# Patient Record
Sex: MALE | Race: BLACK OR AFRICAN AMERICAN
[De-identification: names, ages, dates, MRNs, and addresses within clinical notes are randomized per-mention and may not be internally consistent; named-entity substitution may affect disease eponyms.]

---

## 2020-04-25 ENCOUNTER — HOSPITAL ENCOUNTER (EMERGENCY)
Dept: HOSPITAL 87 - ER | Age: 55
LOS: 1 days | Discharge: HOME | End: 2020-04-26
Payer: MEDICAID

## 2020-04-25 VITALS — HEIGHT: 74 IN | BODY MASS INDEX: 17.54 KG/M2 | WEIGHT: 136.69 LBS

## 2020-04-25 DIAGNOSIS — R53.1: ICD-10-CM

## 2020-04-25 DIAGNOSIS — B34.9: Primary | ICD-10-CM

## 2020-04-25 DIAGNOSIS — E11.9: ICD-10-CM

## 2020-04-25 DIAGNOSIS — J45.909: ICD-10-CM

## 2020-04-25 PROCEDURE — 99285 EMERGENCY DEPT VISIT HI MDM: CPT

## 2020-04-25 PROCEDURE — 71045 X-RAY EXAM CHEST 1 VIEW: CPT

## 2020-04-26 VITALS — DIASTOLIC BLOOD PRESSURE: 62 MMHG | SYSTOLIC BLOOD PRESSURE: 129 MMHG

## 2022-02-19 ENCOUNTER — HOSPITAL ENCOUNTER (INPATIENT)
Dept: HOSPITAL 87 - ER | Age: 57
LOS: 6 days | Discharge: HOME | DRG: 321 | End: 2022-02-25
Attending: INTERNAL MEDICINE | Admitting: INTERNAL MEDICINE
Payer: MEDICAID

## 2022-02-19 VITALS — BODY MASS INDEX: 21.55 KG/M2 | WEIGHT: 177 LBS | HEIGHT: 76 IN

## 2022-02-19 DIAGNOSIS — Z20.822: ICD-10-CM

## 2022-02-19 DIAGNOSIS — E11.9: ICD-10-CM

## 2022-02-19 DIAGNOSIS — M47.12: ICD-10-CM

## 2022-02-19 DIAGNOSIS — D64.9: ICD-10-CM

## 2022-02-19 DIAGNOSIS — F17.210: ICD-10-CM

## 2022-02-19 DIAGNOSIS — Z71.51: ICD-10-CM

## 2022-02-19 DIAGNOSIS — M48.02: Primary | ICD-10-CM

## 2022-02-19 DIAGNOSIS — D72.829: ICD-10-CM
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
## 2022-02-19 DIAGNOSIS — F14.90: ICD-10-CM

## 2022-02-19 DIAGNOSIS — J45.909: ICD-10-CM

## 2022-02-19 DIAGNOSIS — I10: ICD-10-CM

## 2022-02-19 DIAGNOSIS — G82.50: ICD-10-CM

## 2022-02-19 DIAGNOSIS — T38.0X5A: ICD-10-CM

## 2022-02-19 DIAGNOSIS — M47.22: ICD-10-CM

## 2022-02-19 LAB
BASOPHILS NFR BLD AUTO: 0.5 % (ref 0–2)
CHLORIDE SERPL-SCNC: 105 MEQ/L (ref 98–107)
EOSINOPHIL NFR BLD AUTO: 0.3 % (ref 0–5)
ERYTHROCYTE [DISTWIDTH] IN BLOOD BY AUTOMATED COUNT: 13.8 % (ref 11.6–14.6)
HCT VFR BLD AUTO: 37.1 % (ref 42–52)
HGB BLD-MCNC: 12.3 G/DL (ref 14–18)
LYMPHOCYTES NFR BLD AUTO: 13.5 % (ref 20–50)
MCH RBC QN AUTO: 29.9 PG (ref 28–32)
MCV RBC AUTO: 90.1 FL (ref 80–94)
MONOCYTES NFR BLD AUTO: 8 % (ref 2–8)
NEUTROPHILS NFR BLD AUTO: 77.7 % (ref 40–76)
PLATELET # BLD AUTO: 202 X1000/UL (ref 130–400)
PMV BLD AUTO: 7.8 FL (ref 7.4–10.4)
RBC # BLD AUTO: 4.11 MILL/UL (ref 4.7–6.1)

## 2022-02-19 PROCEDURE — 76000 FLUOROSCOPY <1 HR PHYS/QHP: CPT

## 2022-02-19 PROCEDURE — 85025 COMPLETE CBC W/AUTO DIFF WBC: CPT

## 2022-02-19 PROCEDURE — 88304 TISSUE EXAM BY PATHOLOGIST: CPT

## 2022-02-19 PROCEDURE — 36415 COLL VENOUS BLD VENIPUNCTURE: CPT

## 2022-02-19 PROCEDURE — 86850 RBC ANTIBODY SCREEN: CPT

## 2022-02-19 PROCEDURE — 80305 DRUG TEST PRSMV DIR OPT OBS: CPT

## 2022-02-19 PROCEDURE — 97116 GAIT TRAINING THERAPY: CPT

## 2022-02-19 PROCEDURE — 97161 PT EVAL LOW COMPLEX 20 MIN: CPT

## 2022-02-19 PROCEDURE — 71045 X-RAY EXAM CHEST 1 VIEW: CPT

## 2022-02-19 PROCEDURE — C1762 CONN TISS, HUMAN(INC FASCIA): HCPCS

## 2022-02-19 PROCEDURE — 87426 SARSCOV CORONAVIRUS AG IA: CPT

## 2022-02-19 PROCEDURE — 95926 SOMATOSENSORY TESTING: CPT

## 2022-02-19 PROCEDURE — 95925 SOMATOSENSORY TESTING: CPT

## 2022-02-19 PROCEDURE — 95928 C MOTOR EVOKED UPPR LIMBS: CPT

## 2022-02-19 PROCEDURE — 99285 EMERGENCY DEPT VISIT HI MDM: CPT

## 2022-02-19 PROCEDURE — 93005 ELECTROCARDIOGRAM TRACING: CPT

## 2022-02-19 PROCEDURE — 86900 BLOOD TYPING SEROLOGIC ABO: CPT

## 2022-02-19 PROCEDURE — 72040 X-RAY EXAM NECK SPINE 2-3 VW: CPT

## 2022-02-19 PROCEDURE — 82962 GLUCOSE BLOOD TEST: CPT

## 2022-02-19 PROCEDURE — 80048 BASIC METABOLIC PNL TOTAL CA: CPT

## 2022-02-19 PROCEDURE — 72141 MRI NECK SPINE W/O DYE: CPT

## 2022-02-19 PROCEDURE — 80053 COMPREHEN METABOLIC PANEL: CPT

## 2022-02-19 PROCEDURE — 95929 C MOTOR EVOKED LWR LIMBS: CPT

## 2022-02-19 PROCEDURE — 95863 NEEDLE EMG 3 EXTREMITIES: CPT

## 2022-02-19 PROCEDURE — 88311 DECALCIFY TISSUE: CPT

## 2022-02-19 PROCEDURE — 97164 PT RE-EVAL EST PLAN CARE: CPT

## 2022-02-19 PROCEDURE — 97166 OT EVAL MOD COMPLEX 45 MIN: CPT

## 2022-02-20 VITALS — SYSTOLIC BLOOD PRESSURE: 130 MMHG | DIASTOLIC BLOOD PRESSURE: 72 MMHG

## 2022-02-20 VITALS — DIASTOLIC BLOOD PRESSURE: 76 MMHG | SYSTOLIC BLOOD PRESSURE: 142 MMHG

## 2022-02-20 VITALS — DIASTOLIC BLOOD PRESSURE: 82 MMHG | SYSTOLIC BLOOD PRESSURE: 140 MMHG

## 2022-02-20 LAB
AMPHETAMINES UR QL SCN: NEGATIVE
BARBITURATES UR QL SCN: NEGATIVE
BENZODIAZ UR QL SCN: NEGATIVE
BZE UR QL SCN: (no result)
CANNABINOIDS UR QL SCN: NEGATIVE
METHADONE UR QL SCN: NEGATIVE
OPIATES UR QL SCN: (no result)
PCP UR QL SCN: NEGATIVE

## 2022-02-20 RX ADMIN — INSULIN LISPRO SCH UNIT: 100 INJECTION, SOLUTION INTRAVENOUS; SUBCUTANEOUS at 17:50

## 2022-02-20 RX ADMIN — Medication SCH STRIP: at 16:00

## 2022-02-20 RX ADMIN — INSULIN LISPRO SCH UNIT: 100 INJECTION, SOLUTION INTRAVENOUS; SUBCUTANEOUS at 11:28

## 2022-02-20 RX ADMIN — Medication SCH STRIP: at 11:44

## 2022-02-20 RX ADMIN — Medication SCH STRIP: at 21:28

## 2022-02-20 RX ADMIN — INSULIN LISPRO SCH UNIT: 100 INJECTION, SOLUTION INTRAVENOUS; SUBCUTANEOUS at 21:45

## 2022-02-21 VITALS — DIASTOLIC BLOOD PRESSURE: 77 MMHG | SYSTOLIC BLOOD PRESSURE: 142 MMHG

## 2022-02-21 VITALS — DIASTOLIC BLOOD PRESSURE: 76 MMHG | SYSTOLIC BLOOD PRESSURE: 116 MMHG

## 2022-02-21 VITALS — DIASTOLIC BLOOD PRESSURE: 78 MMHG | SYSTOLIC BLOOD PRESSURE: 139 MMHG

## 2022-02-21 VITALS — SYSTOLIC BLOOD PRESSURE: 136 MMHG | DIASTOLIC BLOOD PRESSURE: 85 MMHG

## 2022-02-21 VITALS — SYSTOLIC BLOOD PRESSURE: 142 MMHG | DIASTOLIC BLOOD PRESSURE: 86 MMHG

## 2022-02-21 VITALS — DIASTOLIC BLOOD PRESSURE: 82 MMHG | SYSTOLIC BLOOD PRESSURE: 142 MMHG

## 2022-02-21 LAB
INR PPP: 1
PROTHROMBIN TIME: 10.9 SEC (ref 9.6–11)

## 2022-02-21 RX ADMIN — DEXAMETHASONE SODIUM PHOSPHATE SCH MG: 4 INJECTION, SOLUTION INTRAMUSCULAR; INTRAVENOUS at 23:44

## 2022-02-21 RX ADMIN — Medication SCH STRIP: at 13:03

## 2022-02-21 RX ADMIN — INSULIN LISPRO SCH UNIT: 100 INJECTION, SOLUTION INTRAVENOUS; SUBCUTANEOUS at 21:00

## 2022-02-21 RX ADMIN — INSULIN LISPRO SCH UNIT: 100 INJECTION, SOLUTION INTRAVENOUS; SUBCUTANEOUS at 12:50

## 2022-02-21 RX ADMIN — DEXAMETHASONE SODIUM PHOSPHATE SCH MG: 4 INJECTION, SOLUTION INTRAMUSCULAR; INTRAVENOUS at 19:15

## 2022-02-21 RX ADMIN — Medication SCH STRIP: at 07:56

## 2022-02-21 RX ADMIN — Medication SCH STRIP: at 17:20

## 2022-02-21 RX ADMIN — INSULIN LISPRO SCH UNIT: 100 INJECTION, SOLUTION INTRAVENOUS; SUBCUTANEOUS at 17:50

## 2022-02-21 RX ADMIN — Medication SCH STRIP: at 21:00

## 2022-02-21 RX ADMIN — INSULIN LISPRO SCH UNIT: 100 INJECTION, SOLUTION INTRAVENOUS; SUBCUTANEOUS at 07:50

## 2022-02-22 VITALS — SYSTOLIC BLOOD PRESSURE: 124 MMHG | DIASTOLIC BLOOD PRESSURE: 66 MMHG

## 2022-02-22 VITALS — DIASTOLIC BLOOD PRESSURE: 65 MMHG | SYSTOLIC BLOOD PRESSURE: 110 MMHG

## 2022-02-22 VITALS — DIASTOLIC BLOOD PRESSURE: 61 MMHG | SYSTOLIC BLOOD PRESSURE: 114 MMHG

## 2022-02-22 VITALS — SYSTOLIC BLOOD PRESSURE: 109 MMHG | DIASTOLIC BLOOD PRESSURE: 64 MMHG

## 2022-02-22 VITALS — DIASTOLIC BLOOD PRESSURE: 59 MMHG | SYSTOLIC BLOOD PRESSURE: 111 MMHG

## 2022-02-22 VITALS — DIASTOLIC BLOOD PRESSURE: 67 MMHG | SYSTOLIC BLOOD PRESSURE: 111 MMHG

## 2022-02-22 LAB
APTT PPP: 28.9 SEC (ref 23.4–31)
BASOPHILS NFR BLD AUTO: 0.3 % (ref 0–2)
CHLORIDE SERPL-SCNC: 102 MEQ/L (ref 98–107)
EOSINOPHIL NFR BLD AUTO: 0 % (ref 0–5)
ERYTHROCYTE [DISTWIDTH] IN BLOOD BY AUTOMATED COUNT: 13.8 % (ref 11.6–14.6)
HCT VFR BLD AUTO: 41.4 % (ref 42–52)
HGB BLD-MCNC: 13.4 G/DL (ref 14–18)
INR PPP: 1
LYMPHOCYTES NFR BLD AUTO: 7.3 % (ref 20–50)
MCH RBC QN AUTO: 29.4 PG (ref 28–32)
MCV RBC AUTO: 90.9 FL (ref 80–94)
MONOCYTES NFR BLD AUTO: 3.9 % (ref 2–8)
NEUTROPHILS NFR BLD AUTO: 88.5 % (ref 40–76)
PLATELET # BLD AUTO: 246 X1000/UL (ref 130–400)
PMV BLD AUTO: 8.2 FL (ref 7.4–10.4)
PROTHROMBIN TIME: 10.7 SEC (ref 9.6–11)
RBC # BLD AUTO: 4.55 MILL/UL (ref 4.7–6.1)

## 2022-02-22 RX ADMIN — INSULIN LISPRO SCH UNIT: 100 INJECTION, SOLUTION INTRAVENOUS; SUBCUTANEOUS at 17:30

## 2022-02-22 RX ADMIN — DEXAMETHASONE SODIUM PHOSPHATE SCH MG: 4 INJECTION, SOLUTION INTRAMUSCULAR; INTRAVENOUS at 06:02

## 2022-02-22 RX ADMIN — Medication SCH STRIP: at 21:00

## 2022-02-22 RX ADMIN — DEXAMETHASONE SODIUM PHOSPHATE SCH MG: 4 INJECTION, SOLUTION INTRAMUSCULAR; INTRAVENOUS at 23:41

## 2022-02-22 RX ADMIN — INSULIN LISPRO SCH UNIT: 100 INJECTION, SOLUTION INTRAVENOUS; SUBCUTANEOUS at 21:29

## 2022-02-22 RX ADMIN — INSULIN LISPRO SCH UNIT: 100 INJECTION, SOLUTION INTRAVENOUS; SUBCUTANEOUS at 08:23

## 2022-02-22 RX ADMIN — Medication SCH STRIP: at 17:05

## 2022-02-22 RX ADMIN — INSULIN LISPRO SCH UNIT: 100 INJECTION, SOLUTION INTRAVENOUS; SUBCUTANEOUS at 12:32

## 2022-02-22 RX ADMIN — DEXAMETHASONE SODIUM PHOSPHATE SCH MG: 4 INJECTION, SOLUTION INTRAMUSCULAR; INTRAVENOUS at 12:16

## 2022-02-22 RX ADMIN — Medication SCH STRIP: at 12:18

## 2022-02-22 RX ADMIN — Medication SCH STRIP: at 06:36

## 2022-02-22 RX ADMIN — DEXAMETHASONE SODIUM PHOSPHATE SCH MG: 4 INJECTION, SOLUTION INTRAMUSCULAR; INTRAVENOUS at 17:25

## 2022-02-23 VITALS — DIASTOLIC BLOOD PRESSURE: 78 MMHG | SYSTOLIC BLOOD PRESSURE: 140 MMHG

## 2022-02-23 VITALS — SYSTOLIC BLOOD PRESSURE: 137 MMHG | DIASTOLIC BLOOD PRESSURE: 75 MMHG

## 2022-02-23 VITALS — DIASTOLIC BLOOD PRESSURE: 71 MMHG | SYSTOLIC BLOOD PRESSURE: 115 MMHG

## 2022-02-23 VITALS — SYSTOLIC BLOOD PRESSURE: 106 MMHG | DIASTOLIC BLOOD PRESSURE: 65 MMHG

## 2022-02-23 VITALS — SYSTOLIC BLOOD PRESSURE: 152 MMHG | DIASTOLIC BLOOD PRESSURE: 80 MMHG

## 2022-02-23 VITALS — DIASTOLIC BLOOD PRESSURE: 74 MMHG | SYSTOLIC BLOOD PRESSURE: 124 MMHG

## 2022-02-23 VITALS — SYSTOLIC BLOOD PRESSURE: 155 MMHG | DIASTOLIC BLOOD PRESSURE: 84 MMHG

## 2022-02-23 VITALS — DIASTOLIC BLOOD PRESSURE: 77 MMHG | SYSTOLIC BLOOD PRESSURE: 150 MMHG

## 2022-02-23 VITALS — DIASTOLIC BLOOD PRESSURE: 46 MMHG | SYSTOLIC BLOOD PRESSURE: 212 MMHG

## 2022-02-23 VITALS — SYSTOLIC BLOOD PRESSURE: 138 MMHG | DIASTOLIC BLOOD PRESSURE: 71 MMHG

## 2022-02-23 VITALS — SYSTOLIC BLOOD PRESSURE: 146 MMHG | DIASTOLIC BLOOD PRESSURE: 71 MMHG

## 2022-02-23 VITALS — SYSTOLIC BLOOD PRESSURE: 136 MMHG | DIASTOLIC BLOOD PRESSURE: 78 MMHG

## 2022-02-23 VITALS — DIASTOLIC BLOOD PRESSURE: 72 MMHG | SYSTOLIC BLOOD PRESSURE: 130 MMHG

## 2022-02-23 VITALS — SYSTOLIC BLOOD PRESSURE: 150 MMHG | DIASTOLIC BLOOD PRESSURE: 79 MMHG

## 2022-02-23 VITALS — SYSTOLIC BLOOD PRESSURE: 127 MMHG | DIASTOLIC BLOOD PRESSURE: 73 MMHG

## 2022-02-23 VITALS — SYSTOLIC BLOOD PRESSURE: 141 MMHG | DIASTOLIC BLOOD PRESSURE: 75 MMHG

## 2022-02-23 VITALS — SYSTOLIC BLOOD PRESSURE: 141 MMHG | DIASTOLIC BLOOD PRESSURE: 71 MMHG

## 2022-02-23 VITALS — DIASTOLIC BLOOD PRESSURE: 92 MMHG | SYSTOLIC BLOOD PRESSURE: 154 MMHG

## 2022-02-23 VITALS — DIASTOLIC BLOOD PRESSURE: 70 MMHG | SYSTOLIC BLOOD PRESSURE: 125 MMHG

## 2022-02-23 VITALS — DIASTOLIC BLOOD PRESSURE: 72 MMHG | SYSTOLIC BLOOD PRESSURE: 126 MMHG

## 2022-02-23 VITALS — SYSTOLIC BLOOD PRESSURE: 125 MMHG | DIASTOLIC BLOOD PRESSURE: 68 MMHG

## 2022-02-23 VITALS — DIASTOLIC BLOOD PRESSURE: 58 MMHG | SYSTOLIC BLOOD PRESSURE: 153 MMHG

## 2022-02-23 VITALS — DIASTOLIC BLOOD PRESSURE: 102 MMHG | SYSTOLIC BLOOD PRESSURE: 128 MMHG

## 2022-02-23 VITALS — SYSTOLIC BLOOD PRESSURE: 148 MMHG | DIASTOLIC BLOOD PRESSURE: 72 MMHG

## 2022-02-23 VITALS — SYSTOLIC BLOOD PRESSURE: 147 MMHG | DIASTOLIC BLOOD PRESSURE: 77 MMHG

## 2022-02-23 VITALS — DIASTOLIC BLOOD PRESSURE: 73 MMHG | SYSTOLIC BLOOD PRESSURE: 142 MMHG

## 2022-02-23 VITALS — SYSTOLIC BLOOD PRESSURE: 113 MMHG | DIASTOLIC BLOOD PRESSURE: 74 MMHG

## 2022-02-23 VITALS — DIASTOLIC BLOOD PRESSURE: 66 MMHG | SYSTOLIC BLOOD PRESSURE: 137 MMHG

## 2022-02-23 VITALS — SYSTOLIC BLOOD PRESSURE: 154 MMHG | DIASTOLIC BLOOD PRESSURE: 81 MMHG

## 2022-02-23 VITALS — DIASTOLIC BLOOD PRESSURE: 61 MMHG | SYSTOLIC BLOOD PRESSURE: 129 MMHG

## 2022-02-23 VITALS — SYSTOLIC BLOOD PRESSURE: 116 MMHG | DIASTOLIC BLOOD PRESSURE: 69 MMHG

## 2022-02-23 VITALS — SYSTOLIC BLOOD PRESSURE: 149 MMHG | DIASTOLIC BLOOD PRESSURE: 67 MMHG

## 2022-02-23 VITALS — DIASTOLIC BLOOD PRESSURE: 75 MMHG | SYSTOLIC BLOOD PRESSURE: 135 MMHG

## 2022-02-23 VITALS — SYSTOLIC BLOOD PRESSURE: 110 MMHG | DIASTOLIC BLOOD PRESSURE: 64 MMHG

## 2022-02-23 VITALS — DIASTOLIC BLOOD PRESSURE: 77 MMHG | SYSTOLIC BLOOD PRESSURE: 139 MMHG

## 2022-02-23 VITALS — DIASTOLIC BLOOD PRESSURE: 80 MMHG | SYSTOLIC BLOOD PRESSURE: 144 MMHG

## 2022-02-23 VITALS — DIASTOLIC BLOOD PRESSURE: 70 MMHG | SYSTOLIC BLOOD PRESSURE: 142 MMHG

## 2022-02-23 VITALS — DIASTOLIC BLOOD PRESSURE: 74 MMHG | SYSTOLIC BLOOD PRESSURE: 144 MMHG

## 2022-02-23 VITALS — DIASTOLIC BLOOD PRESSURE: 71 MMHG | SYSTOLIC BLOOD PRESSURE: 142 MMHG

## 2022-02-23 VITALS — SYSTOLIC BLOOD PRESSURE: 141 MMHG | DIASTOLIC BLOOD PRESSURE: 92 MMHG

## 2022-02-23 VITALS — SYSTOLIC BLOOD PRESSURE: 124 MMHG | DIASTOLIC BLOOD PRESSURE: 76 MMHG

## 2022-02-23 VITALS — DIASTOLIC BLOOD PRESSURE: 79 MMHG | SYSTOLIC BLOOD PRESSURE: 158 MMHG

## 2022-02-23 VITALS — SYSTOLIC BLOOD PRESSURE: 145 MMHG | DIASTOLIC BLOOD PRESSURE: 76 MMHG

## 2022-02-23 VITALS — SYSTOLIC BLOOD PRESSURE: 140 MMHG | DIASTOLIC BLOOD PRESSURE: 69 MMHG

## 2022-02-23 VITALS — SYSTOLIC BLOOD PRESSURE: 148 MMHG | DIASTOLIC BLOOD PRESSURE: 79 MMHG

## 2022-02-23 VITALS — SYSTOLIC BLOOD PRESSURE: 143 MMHG | DIASTOLIC BLOOD PRESSURE: 76 MMHG

## 2022-02-23 VITALS — DIASTOLIC BLOOD PRESSURE: 78 MMHG | SYSTOLIC BLOOD PRESSURE: 145 MMHG

## 2022-02-23 VITALS — DIASTOLIC BLOOD PRESSURE: 65 MMHG | SYSTOLIC BLOOD PRESSURE: 121 MMHG

## 2022-02-23 VITALS — SYSTOLIC BLOOD PRESSURE: 140 MMHG | DIASTOLIC BLOOD PRESSURE: 78 MMHG

## 2022-02-23 VITALS — SYSTOLIC BLOOD PRESSURE: 144 MMHG | DIASTOLIC BLOOD PRESSURE: 76 MMHG

## 2022-02-23 VITALS — DIASTOLIC BLOOD PRESSURE: 72 MMHG | SYSTOLIC BLOOD PRESSURE: 123 MMHG

## 2022-02-23 VITALS — DIASTOLIC BLOOD PRESSURE: 88 MMHG | SYSTOLIC BLOOD PRESSURE: 140 MMHG

## 2022-02-23 VITALS — SYSTOLIC BLOOD PRESSURE: 138 MMHG | DIASTOLIC BLOOD PRESSURE: 70 MMHG

## 2022-02-23 VITALS — SYSTOLIC BLOOD PRESSURE: 140 MMHG | DIASTOLIC BLOOD PRESSURE: 76 MMHG

## 2022-02-23 VITALS — SYSTOLIC BLOOD PRESSURE: 128 MMHG | DIASTOLIC BLOOD PRESSURE: 67 MMHG

## 2022-02-23 VITALS — DIASTOLIC BLOOD PRESSURE: 65 MMHG | SYSTOLIC BLOOD PRESSURE: 153 MMHG

## 2022-02-23 VITALS — DIASTOLIC BLOOD PRESSURE: 82 MMHG | SYSTOLIC BLOOD PRESSURE: 145 MMHG

## 2022-02-23 VITALS — SYSTOLIC BLOOD PRESSURE: 145 MMHG | DIASTOLIC BLOOD PRESSURE: 78 MMHG

## 2022-02-23 PROCEDURE — 01N10ZZ RELEASE CERVICAL NERVE, OPEN APPROACH: ICD-10-PCS | Performed by: NEUROLOGICAL SURGERY

## 2022-02-23 PROCEDURE — 0RG2070 FUSION OF 2 OR MORE CERVICAL VERTEBRAL JOINTS WITH AUTOLOGOUS TISSUE SUBSTITUTE, ANTERIOR APPROACH, ANTERIOR COLUMN, OPEN APPROACH: ICD-10-PCS | Performed by: NEUROLOGICAL SURGERY

## 2022-02-23 PROCEDURE — 4A11X4G MONITORING OF PERIPHERAL NERVOUS ELECTRICAL ACTIVITY, INTRAOPERATIVE, EXTERNAL APPROACH: ICD-10-PCS | Performed by: NEUROLOGICAL SURGERY

## 2022-02-23 PROCEDURE — 0RB30ZZ EXCISION OF CERVICAL VERTEBRAL DISC, OPEN APPROACH: ICD-10-PCS | Performed by: NEUROLOGICAL SURGERY

## 2022-02-23 RX ADMIN — INSULIN LISPRO SCH UNIT: 100 INJECTION, SOLUTION INTRAVENOUS; SUBCUTANEOUS at 21:36

## 2022-02-23 RX ADMIN — INSULIN GLARGINE SCH UNIT: 100 INJECTION, SOLUTION SUBCUTANEOUS at 22:24

## 2022-02-23 RX ADMIN — AMLODIPINE BESYLATE SCH MG: 5 TABLET ORAL at 17:53

## 2022-02-23 RX ADMIN — DEXAMETHASONE SODIUM PHOSPHATE SCH MG: 4 INJECTION, SOLUTION INTRAMUSCULAR; INTRAVENOUS at 06:19

## 2022-02-23 RX ADMIN — CEFAZOLIN SODIUM SCH MLS/HR: 1 INJECTION, SOLUTION INTRAVENOUS at 15:37

## 2022-02-23 RX ADMIN — DEXAMETHASONE SODIUM PHOSPHATE SCH MG: 4 INJECTION, SOLUTION INTRAMUSCULAR; INTRAVENOUS at 17:53

## 2022-02-23 RX ADMIN — INSULIN LISPRO SCH UNIT: 100 INJECTION, SOLUTION INTRAVENOUS; SUBCUTANEOUS at 13:15

## 2022-02-23 RX ADMIN — Medication SCH STRIP: at 11:30

## 2022-02-23 RX ADMIN — INSULIN LISPRO SCH UNIT: 100 INJECTION, SOLUTION INTRAVENOUS; SUBCUTANEOUS at 17:56

## 2022-02-23 RX ADMIN — CEFAZOLIN SODIUM SCH MLS/HR: 1 INJECTION, SOLUTION INTRAVENOUS at 21:37

## 2022-02-23 RX ADMIN — Medication SCH STRIP: at 06:57

## 2022-02-23 RX ADMIN — PANTOPRAZOLE SODIUM PRN MLS/HR: 40 INJECTION, POWDER, FOR SOLUTION INTRAVENOUS at 20:13

## 2022-02-23 RX ADMIN — Medication SCH STRIP: at 21:29

## 2022-02-23 RX ADMIN — DEXAMETHASONE SODIUM PHOSPHATE SCH MG: 4 INJECTION, SOLUTION INTRAMUSCULAR; INTRAVENOUS at 13:06

## 2022-02-23 RX ADMIN — PANTOPRAZOLE SODIUM PRN MLS/HR: 40 INJECTION, POWDER, FOR SOLUTION INTRAVENOUS at 13:17

## 2022-02-23 RX ADMIN — DEXAMETHASONE SODIUM PHOSPHATE SCH MG: 4 INJECTION, SOLUTION INTRAMUSCULAR; INTRAVENOUS at 23:40

## 2022-02-23 RX ADMIN — Medication SCH STRIP: at 16:30

## 2022-02-24 VITALS — SYSTOLIC BLOOD PRESSURE: 133 MMHG | DIASTOLIC BLOOD PRESSURE: 67 MMHG

## 2022-02-24 VITALS — DIASTOLIC BLOOD PRESSURE: 73 MMHG | SYSTOLIC BLOOD PRESSURE: 102 MMHG

## 2022-02-24 VITALS — DIASTOLIC BLOOD PRESSURE: 60 MMHG | SYSTOLIC BLOOD PRESSURE: 131 MMHG

## 2022-02-24 VITALS — DIASTOLIC BLOOD PRESSURE: 63 MMHG | SYSTOLIC BLOOD PRESSURE: 131 MMHG

## 2022-02-24 VITALS — DIASTOLIC BLOOD PRESSURE: 102 MMHG | SYSTOLIC BLOOD PRESSURE: 137 MMHG

## 2022-02-24 VITALS — SYSTOLIC BLOOD PRESSURE: 133 MMHG | DIASTOLIC BLOOD PRESSURE: 60 MMHG

## 2022-02-24 VITALS — DIASTOLIC BLOOD PRESSURE: 65 MMHG | SYSTOLIC BLOOD PRESSURE: 136 MMHG

## 2022-02-24 VITALS — DIASTOLIC BLOOD PRESSURE: 78 MMHG | SYSTOLIC BLOOD PRESSURE: 140 MMHG

## 2022-02-24 VITALS — DIASTOLIC BLOOD PRESSURE: 74 MMHG | SYSTOLIC BLOOD PRESSURE: 134 MMHG

## 2022-02-24 VITALS — DIASTOLIC BLOOD PRESSURE: 76 MMHG | SYSTOLIC BLOOD PRESSURE: 132 MMHG

## 2022-02-24 VITALS — DIASTOLIC BLOOD PRESSURE: 74 MMHG | SYSTOLIC BLOOD PRESSURE: 138 MMHG

## 2022-02-24 VITALS — DIASTOLIC BLOOD PRESSURE: 88 MMHG | SYSTOLIC BLOOD PRESSURE: 134 MMHG

## 2022-02-24 VITALS — DIASTOLIC BLOOD PRESSURE: 92 MMHG | SYSTOLIC BLOOD PRESSURE: 145 MMHG

## 2022-02-24 VITALS — SYSTOLIC BLOOD PRESSURE: 122 MMHG | DIASTOLIC BLOOD PRESSURE: 73 MMHG

## 2022-02-24 VITALS — SYSTOLIC BLOOD PRESSURE: 142 MMHG | DIASTOLIC BLOOD PRESSURE: 63 MMHG

## 2022-02-24 VITALS — DIASTOLIC BLOOD PRESSURE: 93 MMHG | SYSTOLIC BLOOD PRESSURE: 152 MMHG

## 2022-02-24 VITALS — SYSTOLIC BLOOD PRESSURE: 137 MMHG | DIASTOLIC BLOOD PRESSURE: 68 MMHG

## 2022-02-24 VITALS — DIASTOLIC BLOOD PRESSURE: 71 MMHG | SYSTOLIC BLOOD PRESSURE: 129 MMHG

## 2022-02-24 VITALS — DIASTOLIC BLOOD PRESSURE: 56 MMHG | SYSTOLIC BLOOD PRESSURE: 124 MMHG

## 2022-02-24 VITALS — SYSTOLIC BLOOD PRESSURE: 127 MMHG | DIASTOLIC BLOOD PRESSURE: 62 MMHG

## 2022-02-24 VITALS — SYSTOLIC BLOOD PRESSURE: 157 MMHG | DIASTOLIC BLOOD PRESSURE: 94 MMHG

## 2022-02-24 VITALS — SYSTOLIC BLOOD PRESSURE: 83 MMHG | DIASTOLIC BLOOD PRESSURE: 51 MMHG

## 2022-02-24 VITALS — SYSTOLIC BLOOD PRESSURE: 144 MMHG | DIASTOLIC BLOOD PRESSURE: 113 MMHG

## 2022-02-24 VITALS — DIASTOLIC BLOOD PRESSURE: 68 MMHG | SYSTOLIC BLOOD PRESSURE: 128 MMHG

## 2022-02-24 VITALS — SYSTOLIC BLOOD PRESSURE: 144 MMHG | DIASTOLIC BLOOD PRESSURE: 73 MMHG

## 2022-02-24 VITALS — SYSTOLIC BLOOD PRESSURE: 149 MMHG | DIASTOLIC BLOOD PRESSURE: 91 MMHG

## 2022-02-24 VITALS — SYSTOLIC BLOOD PRESSURE: 147 MMHG | DIASTOLIC BLOOD PRESSURE: 68 MMHG

## 2022-02-24 VITALS — DIASTOLIC BLOOD PRESSURE: 68 MMHG | SYSTOLIC BLOOD PRESSURE: 107 MMHG

## 2022-02-24 VITALS — SYSTOLIC BLOOD PRESSURE: 157 MMHG | DIASTOLIC BLOOD PRESSURE: 90 MMHG

## 2022-02-24 VITALS — SYSTOLIC BLOOD PRESSURE: 126 MMHG | DIASTOLIC BLOOD PRESSURE: 64 MMHG

## 2022-02-24 VITALS — SYSTOLIC BLOOD PRESSURE: 132 MMHG | DIASTOLIC BLOOD PRESSURE: 77 MMHG

## 2022-02-24 VITALS — DIASTOLIC BLOOD PRESSURE: 50 MMHG | SYSTOLIC BLOOD PRESSURE: 131 MMHG

## 2022-02-24 VITALS — DIASTOLIC BLOOD PRESSURE: 99 MMHG | SYSTOLIC BLOOD PRESSURE: 141 MMHG

## 2022-02-24 VITALS — SYSTOLIC BLOOD PRESSURE: 154 MMHG | DIASTOLIC BLOOD PRESSURE: 77 MMHG

## 2022-02-24 VITALS — SYSTOLIC BLOOD PRESSURE: 127 MMHG | DIASTOLIC BLOOD PRESSURE: 64 MMHG

## 2022-02-24 VITALS — DIASTOLIC BLOOD PRESSURE: 58 MMHG | SYSTOLIC BLOOD PRESSURE: 132 MMHG

## 2022-02-24 VITALS — DIASTOLIC BLOOD PRESSURE: 68 MMHG | SYSTOLIC BLOOD PRESSURE: 132 MMHG

## 2022-02-24 VITALS — SYSTOLIC BLOOD PRESSURE: 125 MMHG | DIASTOLIC BLOOD PRESSURE: 58 MMHG

## 2022-02-24 VITALS — DIASTOLIC BLOOD PRESSURE: 59 MMHG | SYSTOLIC BLOOD PRESSURE: 150 MMHG

## 2022-02-24 VITALS — SYSTOLIC BLOOD PRESSURE: 130 MMHG | DIASTOLIC BLOOD PRESSURE: 87 MMHG

## 2022-02-24 VITALS — DIASTOLIC BLOOD PRESSURE: 84 MMHG | SYSTOLIC BLOOD PRESSURE: 148 MMHG

## 2022-02-24 VITALS — DIASTOLIC BLOOD PRESSURE: 80 MMHG | SYSTOLIC BLOOD PRESSURE: 142 MMHG

## 2022-02-24 VITALS — SYSTOLIC BLOOD PRESSURE: 132 MMHG | DIASTOLIC BLOOD PRESSURE: 66 MMHG

## 2022-02-24 VITALS — SYSTOLIC BLOOD PRESSURE: 138 MMHG | DIASTOLIC BLOOD PRESSURE: 66 MMHG

## 2022-02-24 VITALS — DIASTOLIC BLOOD PRESSURE: 106 MMHG | SYSTOLIC BLOOD PRESSURE: 141 MMHG

## 2022-02-24 VITALS — SYSTOLIC BLOOD PRESSURE: 128 MMHG | DIASTOLIC BLOOD PRESSURE: 76 MMHG

## 2022-02-24 VITALS — SYSTOLIC BLOOD PRESSURE: 150 MMHG | DIASTOLIC BLOOD PRESSURE: 77 MMHG

## 2022-02-24 VITALS — SYSTOLIC BLOOD PRESSURE: 138 MMHG | DIASTOLIC BLOOD PRESSURE: 59 MMHG

## 2022-02-24 VITALS — DIASTOLIC BLOOD PRESSURE: 65 MMHG | SYSTOLIC BLOOD PRESSURE: 141 MMHG

## 2022-02-24 VITALS — DIASTOLIC BLOOD PRESSURE: 93 MMHG | SYSTOLIC BLOOD PRESSURE: 161 MMHG

## 2022-02-24 VITALS — DIASTOLIC BLOOD PRESSURE: 67 MMHG | SYSTOLIC BLOOD PRESSURE: 135 MMHG

## 2022-02-24 VITALS — DIASTOLIC BLOOD PRESSURE: 79 MMHG | SYSTOLIC BLOOD PRESSURE: 150 MMHG

## 2022-02-24 VITALS — SYSTOLIC BLOOD PRESSURE: 132 MMHG | DIASTOLIC BLOOD PRESSURE: 70 MMHG

## 2022-02-24 VITALS — SYSTOLIC BLOOD PRESSURE: 153 MMHG | DIASTOLIC BLOOD PRESSURE: 84 MMHG

## 2022-02-24 VITALS — DIASTOLIC BLOOD PRESSURE: 72 MMHG | SYSTOLIC BLOOD PRESSURE: 128 MMHG

## 2022-02-24 VITALS — DIASTOLIC BLOOD PRESSURE: 87 MMHG | SYSTOLIC BLOOD PRESSURE: 144 MMHG

## 2022-02-24 VITALS — DIASTOLIC BLOOD PRESSURE: 92 MMHG | SYSTOLIC BLOOD PRESSURE: 143 MMHG

## 2022-02-24 VITALS — SYSTOLIC BLOOD PRESSURE: 140 MMHG | DIASTOLIC BLOOD PRESSURE: 75 MMHG

## 2022-02-24 VITALS — DIASTOLIC BLOOD PRESSURE: 74 MMHG | SYSTOLIC BLOOD PRESSURE: 128 MMHG

## 2022-02-24 VITALS — SYSTOLIC BLOOD PRESSURE: 141 MMHG | DIASTOLIC BLOOD PRESSURE: 86 MMHG

## 2022-02-24 VITALS — DIASTOLIC BLOOD PRESSURE: 61 MMHG | SYSTOLIC BLOOD PRESSURE: 120 MMHG

## 2022-02-24 VITALS — DIASTOLIC BLOOD PRESSURE: 67 MMHG | SYSTOLIC BLOOD PRESSURE: 128 MMHG

## 2022-02-24 VITALS — DIASTOLIC BLOOD PRESSURE: 65 MMHG | SYSTOLIC BLOOD PRESSURE: 152 MMHG

## 2022-02-24 VITALS — SYSTOLIC BLOOD PRESSURE: 148 MMHG | DIASTOLIC BLOOD PRESSURE: 89 MMHG

## 2022-02-24 VITALS — DIASTOLIC BLOOD PRESSURE: 57 MMHG | SYSTOLIC BLOOD PRESSURE: 131 MMHG

## 2022-02-24 VITALS — SYSTOLIC BLOOD PRESSURE: 150 MMHG | DIASTOLIC BLOOD PRESSURE: 100 MMHG

## 2022-02-24 RX ADMIN — CEFAZOLIN SODIUM SCH MLS/HR: 1 INJECTION, SOLUTION INTRAVENOUS at 05:17

## 2022-02-24 RX ADMIN — INSULIN GLARGINE SCH UNIT: 100 INJECTION, SOLUTION SUBCUTANEOUS at 21:48

## 2022-02-24 RX ADMIN — INSULIN LISPRO SCH UNIT: 100 INJECTION, SOLUTION INTRAVENOUS; SUBCUTANEOUS at 21:47

## 2022-02-24 RX ADMIN — INSULIN LISPRO SCH UNIT: 100 INJECTION, SOLUTION INTRAVENOUS; SUBCUTANEOUS at 17:50

## 2022-02-24 RX ADMIN — PANTOPRAZOLE SODIUM PRN MLS/HR: 40 INJECTION, POWDER, FOR SOLUTION INTRAVENOUS at 04:12

## 2022-02-24 RX ADMIN — Medication SCH STRIP: at 16:27

## 2022-02-24 RX ADMIN — INSULIN LISPRO SCH UNIT: 100 INJECTION, SOLUTION INTRAVENOUS; SUBCUTANEOUS at 12:31

## 2022-02-24 RX ADMIN — AMLODIPINE BESYLATE SCH MG: 5 TABLET ORAL at 09:51

## 2022-02-24 RX ADMIN — Medication SCH STRIP: at 11:30

## 2022-02-24 RX ADMIN — DEXAMETHASONE SODIUM PHOSPHATE SCH MG: 4 INJECTION, SOLUTION INTRAMUSCULAR; INTRAVENOUS at 05:16

## 2022-02-24 RX ADMIN — INSULIN LISPRO SCH UNIT: 100 INJECTION, SOLUTION INTRAVENOUS; SUBCUTANEOUS at 06:05

## 2022-02-24 RX ADMIN — DEXAMETHASONE SODIUM PHOSPHATE SCH MG: 4 INJECTION, SOLUTION INTRAMUSCULAR; INTRAVENOUS at 12:28

## 2022-02-24 RX ADMIN — Medication SCH STRIP: at 05:38

## 2022-02-24 RX ADMIN — Medication SCH STRIP: at 21:48

## 2022-02-24 RX ADMIN — CEFAZOLIN SODIUM SCH MLS/HR: 1 INJECTION, SOLUTION INTRAVENOUS at 13:18

## 2022-02-25 VITALS — DIASTOLIC BLOOD PRESSURE: 78 MMHG | SYSTOLIC BLOOD PRESSURE: 137 MMHG

## 2022-02-25 VITALS — DIASTOLIC BLOOD PRESSURE: 80 MMHG | SYSTOLIC BLOOD PRESSURE: 135 MMHG

## 2022-02-25 VITALS — SYSTOLIC BLOOD PRESSURE: 151 MMHG | DIASTOLIC BLOOD PRESSURE: 85 MMHG

## 2022-02-25 VITALS — DIASTOLIC BLOOD PRESSURE: 86 MMHG | SYSTOLIC BLOOD PRESSURE: 139 MMHG

## 2022-02-25 VITALS — DIASTOLIC BLOOD PRESSURE: 83 MMHG | SYSTOLIC BLOOD PRESSURE: 135 MMHG

## 2022-02-25 VITALS — SYSTOLIC BLOOD PRESSURE: 148 MMHG | DIASTOLIC BLOOD PRESSURE: 78 MMHG

## 2022-02-25 RX ADMIN — INSULIN LISPRO SCH UNIT: 100 INJECTION, SOLUTION INTRAVENOUS; SUBCUTANEOUS at 09:49

## 2022-02-25 RX ADMIN — Medication SCH STRIP: at 08:15

## 2022-02-25 RX ADMIN — Medication SCH STRIP: at 13:17

## 2022-02-25 RX ADMIN — INSULIN LISPRO SCH UNIT: 100 INJECTION, SOLUTION INTRAVENOUS; SUBCUTANEOUS at 13:26

## 2022-03-11 ENCOUNTER — HOSPITAL ENCOUNTER (EMERGENCY)
Dept: HOSPITAL 87 - ER | Age: 57
Discharge: HOME | End: 2022-03-11
Payer: MEDICAID

## 2022-03-11 VITALS — BODY MASS INDEX: 22.01 KG/M2 | HEIGHT: 76 IN | WEIGHT: 180.78 LBS

## 2022-03-11 VITALS — SYSTOLIC BLOOD PRESSURE: 128 MMHG | DIASTOLIC BLOOD PRESSURE: 74 MMHG

## 2022-03-11 DIAGNOSIS — S11.81XD: Primary | ICD-10-CM

## 2022-03-11 DIAGNOSIS — Z79.899: ICD-10-CM
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
## 2022-03-11 DIAGNOSIS — E11.9: ICD-10-CM

## 2022-03-11 DIAGNOSIS — J45.909: ICD-10-CM

## 2022-03-11 PROCEDURE — 99281 EMR DPT VST MAYX REQ PHY/QHP: CPT

## 2025-02-25 ENCOUNTER — HOSPITAL ENCOUNTER (EMERGENCY)
Facility: HOSPITAL | Age: 60
Discharge: HOME | End: 2025-02-26
Payer: COMMERCIAL

## 2025-02-25 VITALS
BODY MASS INDEX: 28.88 KG/M2 | TEMPERATURE: 99.2 F | RESPIRATION RATE: 18 BRPM | HEART RATE: 92 BPM | DIASTOLIC BLOOD PRESSURE: 91 MMHG | SYSTOLIC BLOOD PRESSURE: 144 MMHG | OXYGEN SATURATION: 96 % | WEIGHT: 195 LBS | HEIGHT: 69 IN

## 2025-02-25 DIAGNOSIS — R10.32 LEFT LOWER QUADRANT ABDOMINAL PAIN: Primary | ICD-10-CM

## 2025-02-25 LAB
ALBUMIN SERPL BCP-MCNC: 4.5 G/DL (ref 3.4–5)
ALP SERPL-CCNC: 60 U/L (ref 33–120)
ALT SERPL W P-5'-P-CCNC: 22 U/L (ref 10–52)
ANION GAP SERPL CALC-SCNC: 16 MMOL/L (ref 10–20)
AST SERPL W P-5'-P-CCNC: 38 U/L (ref 9–39)
BASOPHILS # BLD AUTO: 0.02 X10*3/UL (ref 0–0.1)
BASOPHILS NFR BLD AUTO: 0.3 %
BILIRUB SERPL-MCNC: 0.5 MG/DL (ref 0–1.2)
BUN SERPL-MCNC: 15 MG/DL (ref 6–23)
CALCIUM SERPL-MCNC: 9.4 MG/DL (ref 8.6–10.6)
CHLORIDE SERPL-SCNC: 103 MMOL/L (ref 98–107)
CO2 SERPL-SCNC: 24 MMOL/L (ref 21–32)
CREAT SERPL-MCNC: 0.67 MG/DL (ref 0.5–1.3)
EGFRCR SERPLBLD CKD-EPI 2021: >90 ML/MIN/1.73M*2
EOSINOPHIL # BLD AUTO: 0.07 X10*3/UL (ref 0–0.7)
EOSINOPHIL NFR BLD AUTO: 0.9 %
ERYTHROCYTE [DISTWIDTH] IN BLOOD BY AUTOMATED COUNT: 11.7 % (ref 11.5–14.5)
GLUCOSE SERPL-MCNC: 89 MG/DL (ref 74–99)
HCT VFR BLD AUTO: 38.4 % (ref 41–52)
HGB BLD-MCNC: 13.2 G/DL (ref 13.5–17.5)
IMM GRANULOCYTES # BLD AUTO: 0.02 X10*3/UL (ref 0–0.7)
IMM GRANULOCYTES NFR BLD AUTO: 0.3 % (ref 0–0.9)
LYMPHOCYTES # BLD AUTO: 1.52 X10*3/UL (ref 1.2–4.8)
LYMPHOCYTES NFR BLD AUTO: 19.3 %
MCH RBC QN AUTO: 28.4 PG (ref 26–34)
MCHC RBC AUTO-ENTMCNC: 34.4 G/DL (ref 32–36)
MCV RBC AUTO: 83 FL (ref 80–100)
MONOCYTES # BLD AUTO: 0.56 X10*3/UL (ref 0.1–1)
MONOCYTES NFR BLD AUTO: 7.1 %
NEUTROPHILS # BLD AUTO: 5.7 X10*3/UL (ref 1.2–7.7)
NEUTROPHILS NFR BLD AUTO: 72.1 %
NRBC BLD-RTO: 0 /100 WBCS (ref 0–0)
PLATELET # BLD AUTO: 280 X10*3/UL (ref 150–450)
POTASSIUM SERPL-SCNC: 3.8 MMOL/L (ref 3.5–5.3)
PROT SERPL-MCNC: 7.1 G/DL (ref 6.4–8.2)
RBC # BLD AUTO: 4.64 X10*6/UL (ref 4.5–5.9)
SODIUM SERPL-SCNC: 139 MMOL/L (ref 136–145)
WBC # BLD AUTO: 7.9 X10*3/UL (ref 4.4–11.3)

## 2025-02-25 PROCEDURE — 83690 ASSAY OF LIPASE: CPT

## 2025-02-25 PROCEDURE — 80053 COMPREHEN METABOLIC PANEL: CPT | Performed by: EMERGENCY MEDICINE

## 2025-02-25 PROCEDURE — 87636 SARSCOV2 & INF A&B AMP PRB: CPT

## 2025-02-25 PROCEDURE — 96374 THER/PROPH/DIAG INJ IV PUSH: CPT

## 2025-02-25 PROCEDURE — 36415 COLL VENOUS BLD VENIPUNCTURE: CPT | Performed by: EMERGENCY MEDICINE

## 2025-02-25 PROCEDURE — 36415 COLL VENOUS BLD VENIPUNCTURE: CPT

## 2025-02-25 PROCEDURE — 99285 EMERGENCY DEPT VISIT HI MDM: CPT | Mod: 25

## 2025-02-25 PROCEDURE — 2500000004 HC RX 250 GENERAL PHARMACY W/ HCPCS (ALT 636 FOR OP/ED)

## 2025-02-25 PROCEDURE — 83735 ASSAY OF MAGNESIUM: CPT

## 2025-02-25 PROCEDURE — 85025 COMPLETE CBC W/AUTO DIFF WBC: CPT | Performed by: EMERGENCY MEDICINE

## 2025-02-25 RX ORDER — DICYCLOMINE HYDROCHLORIDE 10 MG/ML
20 INJECTION INTRAMUSCULAR ONCE
Status: DISCONTINUED | OUTPATIENT
Start: 2025-02-25 | End: 2025-02-26 | Stop reason: HOSPADM

## 2025-02-25 RX ORDER — ONDANSETRON HYDROCHLORIDE 2 MG/ML
4 INJECTION, SOLUTION INTRAVENOUS ONCE
Status: DISCONTINUED | OUTPATIENT
Start: 2025-02-25 | End: 2025-02-26 | Stop reason: HOSPADM

## 2025-02-25 RX ORDER — FAMOTIDINE 10 MG/ML
40 INJECTION, SOLUTION INTRAVENOUS ONCE
Status: DISCONTINUED | OUTPATIENT
Start: 2025-02-25 | End: 2025-02-26 | Stop reason: HOSPADM

## 2025-02-25 RX ORDER — KETOROLAC TROMETHAMINE 30 MG/ML
30 INJECTION, SOLUTION INTRAMUSCULAR; INTRAVENOUS ONCE
Status: COMPLETED | OUTPATIENT
Start: 2025-02-25 | End: 2025-02-25

## 2025-02-25 RX ADMIN — KETOROLAC TROMETHAMINE 30 MG: 30 INJECTION, SOLUTION INTRAMUSCULAR; INTRAVENOUS at 23:58

## 2025-02-25 ASSESSMENT — PAIN DESCRIPTION - DESCRIPTORS: DESCRIPTORS: SHARP

## 2025-02-25 ASSESSMENT — PAIN DESCRIPTION - FREQUENCY: FREQUENCY: INTERMITTENT

## 2025-02-25 ASSESSMENT — PAIN DESCRIPTION - ORIENTATION: ORIENTATION: LEFT;LOWER

## 2025-02-25 ASSESSMENT — PAIN DESCRIPTION - DIRECTION: RADIATING_TOWARDS: LEFT FLANK

## 2025-02-25 ASSESSMENT — COLUMBIA-SUICIDE SEVERITY RATING SCALE - C-SSRS
2. HAVE YOU ACTUALLY HAD ANY THOUGHTS OF KILLING YOURSELF?: NO
1. IN THE PAST MONTH, HAVE YOU WISHED YOU WERE DEAD OR WISHED YOU COULD GO TO SLEEP AND NOT WAKE UP?: NO
6. HAVE YOU EVER DONE ANYTHING, STARTED TO DO ANYTHING, OR PREPARED TO DO ANYTHING TO END YOUR LIFE?: NO

## 2025-02-25 ASSESSMENT — PAIN SCALES - GENERAL: PAINLEVEL_OUTOF10: 10 - WORST POSSIBLE PAIN

## 2025-02-25 ASSESSMENT — PAIN DESCRIPTION - PAIN TYPE: TYPE: ACUTE PAIN

## 2025-02-25 ASSESSMENT — PAIN DESCRIPTION - ONSET: ONSET: GRADUAL

## 2025-02-25 ASSESSMENT — PAIN - FUNCTIONAL ASSESSMENT: PAIN_FUNCTIONAL_ASSESSMENT: 0-10

## 2025-02-25 ASSESSMENT — PAIN DESCRIPTION - PROGRESSION: CLINICAL_PROGRESSION: NOT CHANGED

## 2025-02-25 ASSESSMENT — PAIN DESCRIPTION - LOCATION: LOCATION: ABDOMEN

## 2025-02-26 ENCOUNTER — APPOINTMENT (OUTPATIENT)
Dept: RADIOLOGY | Facility: HOSPITAL | Age: 60
End: 2025-02-26
Payer: COMMERCIAL

## 2025-02-26 LAB
FLUAV RNA RESP QL NAA+PROBE: NOT DETECTED
FLUBV RNA RESP QL NAA+PROBE: NOT DETECTED
LIPASE SERPL-CCNC: 15 U/L (ref 9–82)
MAGNESIUM SERPL-MCNC: 1.73 MG/DL (ref 1.6–2.4)
SARS-COV-2 RNA RESP QL NAA+PROBE: NOT DETECTED

## 2025-02-26 PROCEDURE — 2550000001 HC RX 255 CONTRASTS

## 2025-02-26 PROCEDURE — 74177 CT ABD & PELVIS W/CONTRAST: CPT | Performed by: STUDENT IN AN ORGANIZED HEALTH CARE EDUCATION/TRAINING PROGRAM

## 2025-02-26 PROCEDURE — 74177 CT ABD & PELVIS W/CONTRAST: CPT

## 2025-02-26 RX ORDER — OMEPRAZOLE 20 MG/1
20 CAPSULE, DELAYED RELEASE ORAL DAILY
Qty: 30 CAPSULE | Refills: 0 | Status: SHIPPED | OUTPATIENT
Start: 2025-02-26 | End: 2025-03-28

## 2025-02-26 RX ORDER — ONDANSETRON 4 MG/1
4 TABLET, FILM COATED ORAL EVERY 6 HOURS PRN
Qty: 12 TABLET | Refills: 0 | Status: SHIPPED | OUTPATIENT
Start: 2025-02-26 | End: 2025-03-01

## 2025-02-26 RX ORDER — DICYCLOMINE HYDROCHLORIDE 20 MG/1
20 TABLET ORAL EVERY 12 HOURS PRN
Qty: 20 TABLET | Refills: 0 | Status: SHIPPED | OUTPATIENT
Start: 2025-02-26 | End: 2025-03-08

## 2025-02-26 RX ORDER — SUCRALFATE 1 G/1
1 TABLET ORAL
Qty: 120 TABLET | Refills: 0 | Status: SHIPPED | OUTPATIENT
Start: 2025-02-26 | End: 2025-03-28

## 2025-02-26 RX ADMIN — IOHEXOL 80 ML: 350 INJECTION, SOLUTION INTRAVENOUS at 01:37

## 2025-02-26 NOTE — ED PROVIDER NOTES
HPI   Chief Complaint   Patient presents with    Abdominal Pain     Lower left     Flank Pain   This is a 59-year-old male with a past medical history significant for hypertension and prior closed fracture of the larynx and trachea who presents to the ED with abdominal pain.  Patient states that 3 days ago, on Sunday, he traveled to the [facility].  Shortly after arriving in the afternoon he ate a seafood sandwich and salsa.  That evening, at 8 PM he developed sharp pains in his left flank that radiate into his left lower abdomen, as well as nausea.  Denies any episodes of emesis. He had a normal formed bowel movement that evening, no episodes of diarrhea.  He states that since then, his pain has remained constant.  He notices that lying down flat improves his pain and standing up makes it worse. He was seen at a clinic in the [facility] and had blood work drawn, he was told was normal.  He was advised to follow-up somewhere where he could have radiology imaging.  Patient states that he just arrived back home this evening and presented to the ED.   No sick contacts. No prior history of abdominal surgeries.    Denies fevers, chills, headache, dizziness, cough, chest pain, SOB, diarrhea or urinary symptoms    Limitations to history: None  Independent Historians: Patient  External Records Reviewed: None    Patient History   No past medical history on file.  No past surgical history on file.  No family history on file.  Social History     Tobacco Use    Smoking status: Not on file    Smokeless tobacco: Not on file   Substance Use Topics    Alcohol use: Not on file    Drug use: Not on file       Physical Exam   ED Triage Vitals [02/25/25 2207]   Temperature Heart Rate Respirations BP   37.3 °C (99.2 °F) 92 18 (!) 144/91      Pulse Ox Temp Source Heart Rate Source Patient Position   96 % Temporal Monitor Sitting      BP Location FiO2 (%)     Right arm --       Physical Exam  Constitutional:       General: He is not in acute  distress.     Appearance: He is not ill-appearing or diaphoretic.   HENT:      Head: Normocephalic and atraumatic.   Cardiovascular:      Rate and Rhythm: Normal rate and regular rhythm.      Heart sounds: Normal heart sounds.   Pulmonary:      Effort: Pulmonary effort is normal. No respiratory distress.      Breath sounds: Normal breath sounds. No wheezing, rhonchi or rales.   Abdominal:      General: Bowel sounds are normal. There is no distension.      Palpations: Abdomen is soft.      Tenderness: There is no abdominal tenderness. There is no right CVA tenderness, left CVA tenderness, guarding or rebound.   Musculoskeletal:         General: No deformity or signs of injury.      Cervical back: Normal range of motion and neck supple. No rigidity or tenderness.   Skin:     General: Skin is warm and dry.      Coloration: Skin is not jaundiced or pale.   Neurological:      General: No focal deficit present.      Mental Status: He is alert and oriented to person, place, and time.         ED Course & MDM   Diagnoses as of 02/26/25 0225   Left lower quadrant abdominal pain         Medical Decision Making  This is a 59-year-old male with a past medical history significant for hypertension and prior closed fracture of the larynx and trachea who presents to the ED with abdominal pain.  Patient states that 3 days ago, on Sunday, he traveled to the [facility].  Shortly after arriving in the afternoon he ate a seafood sandwich and some salsa.  That evening, at 8 PM he developed sharp pains in his left flank and left lower abdomen, as well as nausea.      On physical exam, patient is non ill-appearing, nondiaphoretic and in no acute distress. No active vomiting or retching. Abdomen is soft, nondistended and nontender with normal bowel sounds.  There is no guarding, rebound or evidence of peritonitis.  No CVA tenderness.  Vitals are stable.    Administered Toradol for pain, patient declined Zofran, Bentyl and Pepcid.  CBC is  unremarkable, no leukocytosis or anemia.  CMP shows normal renal and hepatic functions without any critical electrolyte disturbances.  Lipase is normal, low suspicion for acute pancreatitis.  Viral testing is negative for COVID and influenza.  On CT abdomen pelvis, there was mild circumferential gastric wall thickening may be related to under distention of the stomach or may reflect mild gastritis    Discussed results of ED findings with patient and counseled him to follow-up with GI, placed an outpatient referral.  Prescribed omeprazole, Carafate, Zofran and Bentyl.  He was advised to return to the ED with any new or worsening symptoms.  Patient verbalized understanding was agreeable to plan of care.  Discharged stable condition.       Labs Reviewed   CBC WITH AUTO DIFFERENTIAL - Abnormal       Result Value    WBC 7.9      nRBC 0.0      RBC 4.64      Hemoglobin 13.2 (*)     Hematocrit 38.4 (*)     MCV 83      MCH 28.4      MCHC 34.4      RDW 11.7      Platelets 280      Neutrophils % 72.1      Immature Granulocytes %, Automated 0.3      Lymphocytes % 19.3      Monocytes % 7.1      Eosinophils % 0.9      Basophils % 0.3      Neutrophils Absolute 5.70      Immature Granulocytes Absolute, Automated 0.02      Lymphocytes Absolute 1.52      Monocytes Absolute 0.56      Eosinophils Absolute 0.07      Basophils Absolute 0.02     COMPREHENSIVE METABOLIC PANEL - Normal    Glucose 89      Sodium 139      Potassium 3.8      Chloride 103      Bicarbonate 24      Anion Gap 16      Urea Nitrogen 15      Creatinine 0.67      eGFR >90      Calcium 9.4      Albumin 4.5      Alkaline Phosphatase 60      Total Protein 7.1      AST 38      Bilirubin, Total 0.5      ALT 22     LIPASE - Normal    Lipase 15      Narrative:     Venipuncture immediately after or during the administration of Metamizole may lead to falsely low results. Testing should be performed immediately prior to Metamizole dosing.   MAGNESIUM - Normal    Magnesium 1.73      SARS-COV-2 AND INFLUENZA A/B PCR - Normal    Flu A Result Not Detected      Flu B Result Not Detected      Coronavirus 2019, PCR Not Detected      Narrative:     This assay is an FDA-cleared, in vitro diagnostic nucleic acid amplification test for the qualitative detection and differentiation of SARS CoV-2/ Influenza A/B from nasopharyngeal specimens collected from individuals with signs and symptoms of respiratory tract infections, and has been validated for use at [facility]. Negative results do not preclude COVID-19/ Influenza A/B infections and should not be used as the sole basis for diagnosis, treatment, or other management decisions. Testing for SARS CoV-2 is recommended only for patients who meet current clinical and/or epidemiological criteria defined by federal, state, or local public health directives.         CT abdomen pelvis w IV contrast   Final Result   Mild circumferential bladder wall thickening is nonspecific and may   be related to underdistention but cystitis is not excluded. Correlate   with urinalysis.        Mild circumferential gastric wall thickening may be related to   underdistention of the stomach or may reflect mild gastritis.   Correlate with clinical symptoms.        No other acute findings in the abdomen or pelvis.        MACRO:   None        Signed by: Alexis Stephens 2/26/2025 2:37 AM   Dictation workstation:   [id number]                    Gina Kline PA-C  02/26/25 1908

## 2025-02-26 NOTE — DISCHARGE INSTRUCTIONS
Please follow-up with GI if your symptoms do not begin to improve, we gave you a referral and the contact information for their office

## 2025-02-26 NOTE — ED TRIAGE NOTES
Patient presents to the ED with complaint of lower left abdominal pain that radiates to his left flank. Patient states he visited the [facility] Sunday ate [employer] ornelas, the following day the pain started. Endorses 9/10 sharp pain, when walking. Endorses frequency with urination. Denies shortness of breathe, and chest pain

## 2025-04-28 ENCOUNTER — APPOINTMENT (OUTPATIENT)
Dept: GASTROENTEROLOGY | Facility: HOSPITAL | Age: 60
End: 2025-04-28
Payer: COMMERCIAL